# Patient Record
Sex: FEMALE | Race: WHITE | URBAN - NONMETROPOLITAN AREA
[De-identification: names, ages, dates, MRNs, and addresses within clinical notes are randomized per-mention and may not be internally consistent; named-entity substitution may affect disease eponyms.]

---

## 2020-10-28 ENCOUNTER — OPTICAL OFFICE (OUTPATIENT)
Dept: URBAN - NONMETROPOLITAN AREA CLINIC 5 | Facility: CLINIC | Age: 50
Setting detail: OPHTHALMOLOGY
End: 2020-10-28
Payer: COMMERCIAL

## 2020-10-28 ENCOUNTER — RX ONLY (RX ONLY)
Age: 50
End: 2020-10-28

## 2020-10-28 ENCOUNTER — DOCTOR'S OFFICE (OUTPATIENT)
Dept: URBAN - NONMETROPOLITAN AREA CLINIC 2 | Facility: CLINIC | Age: 50
Setting detail: OPHTHALMOLOGY
End: 2020-10-28
Payer: COMMERCIAL

## 2020-10-28 DIAGNOSIS — H52.4: ICD-10-CM

## 2020-10-28 DIAGNOSIS — H53.022: ICD-10-CM

## 2020-10-28 PROCEDURE — V2020 VISION SVCS FRAMES PURCHASES: HCPCS | Performed by: OPTOMETRIST

## 2020-10-28 PROCEDURE — 92004 COMPRE OPH EXAM NEW PT 1/>: CPT | Performed by: OPTOMETRIST

## 2020-10-28 PROCEDURE — V2200 LENS SPHER BIFOC PLANO 4.00D: HCPCS | Performed by: OPTOMETRIST

## 2020-10-28 PROCEDURE — V2744 TINT PHOTOCHROMATIC LENS/ES: HCPCS | Performed by: OPTOMETRIST

## 2020-10-28 PROCEDURE — 92015 DETERMINE REFRACTIVE STATE: CPT | Performed by: OPTOMETRIST

## 2020-10-28 PROCEDURE — V2784 LENS POLYCARB OR EQUAL: HCPCS | Performed by: OPTOMETRIST

## 2020-10-28 PROCEDURE — V2781 PROGRESSIVE LENS PER LENS: HCPCS | Performed by: OPTOMETRIST

## 2020-10-28 ASSESSMENT — REFRACTION_AUTOREFRACTION
OS_SPHERE: +3.25
OS_AXIS: 172
OS_CYLINDER: -4.00
OD_SPHERE: +0.25
OD_AXIS: 000
OD_CYLINDER: 0.00

## 2020-10-28 ASSESSMENT — REFRACTION_MANIFEST
OD_ADD: +1.75
OD_SPHERE: PL
OD_VA1: 20/20
OD_VA2: 20/20
OD_VA2: 20/20
OS_VA1: 20/200NI
OD_SPHERE: PL
OS_VA2: 20/200NI
OD_ADD: +1.75
OS_AXIS: 175
OS_ADD: +1.75
OS_CYLINDER: -4.00
OS_VA1: 20/200NI
OS_SPHERE: +3.00
OS_ADD: +1.75
OD_VA1: 20/20
OS_SPHERE: BALANCE
OS_VA2: 20/200NI

## 2020-10-28 ASSESSMENT — VISUAL ACUITY
OS_BCVA: 20/20
OD_BCVA: 20/400+1

## 2020-10-28 ASSESSMENT — AXIALLENGTH_DERIVED
OS_AL: 22.5137
OD_AL: 22.9175
OS_AL: 22.6028

## 2020-10-28 ASSESSMENT — SPHEQUIV_DERIVED
OS_SPHEQUIV: 1.25
OS_SPHEQUIV: 1
OD_SPHEQUIV: 0.25

## 2020-10-28 ASSESSMENT — KERATOMETRY
OD_K2POWER_DIOPTERS: 45.50
OD_AXISANGLE_DEGREES: 017
OS_K2POWER_DIOPTERS: 47.00
OD_K1POWER_DIOPTERS: 44.75
OS_K1POWER_DIOPTERS: 43.50
OS_AXISANGLE_DEGREES: 077

## 2020-10-28 ASSESSMENT — TONOMETRY
OD_IOP_MMHG: 20
OS_IOP_MMHG: 20

## 2020-10-28 ASSESSMENT — REFRACTION_CURRENTRX
OD_OVR_VA: 20/
OS_OVR_VA: 20/

## 2020-10-28 ASSESSMENT — CONFRONTATIONAL VISUAL FIELD TEST (CVF)
OS_FINDINGS: FULL
OD_FINDINGS: FULL

## 2022-06-08 ENCOUNTER — OFFICE VISIT (OUTPATIENT)
Dept: URGENT CARE | Facility: CLINIC | Age: 52
End: 2022-06-08
Payer: COMMERCIAL

## 2022-06-08 VITALS
WEIGHT: 129 LBS | OXYGEN SATURATION: 99 % | TEMPERATURE: 99.2 F | RESPIRATION RATE: 18 BRPM | HEART RATE: 63 BPM | BODY MASS INDEX: 21.49 KG/M2 | HEIGHT: 65 IN

## 2022-06-08 DIAGNOSIS — J02.9 SORE THROAT: Primary | ICD-10-CM

## 2022-06-08 DIAGNOSIS — R05.9 COUGH: ICD-10-CM

## 2022-06-08 LAB
S PYO AG THROAT QL: NEGATIVE
SARS-COV-2 AG UPPER RESP QL IA: NEGATIVE
VALID CONTROL: NORMAL

## 2022-06-08 PROCEDURE — 99203 OFFICE O/P NEW LOW 30 MIN: CPT

## 2022-06-08 PROCEDURE — 87880 STREP A ASSAY W/OPTIC: CPT

## 2022-06-08 PROCEDURE — 87811 SARS-COV-2 COVID19 W/OPTIC: CPT

## 2022-06-08 RX ORDER — SUMATRIPTAN 100 MG/1
100 TABLET, FILM COATED ORAL AS NEEDED
COMMUNITY
Start: 2022-01-26

## 2022-06-08 RX ORDER — SERTRALINE HYDROCHLORIDE 25 MG/1
25 TABLET, FILM COATED ORAL DAILY
COMMUNITY
Start: 2022-06-07

## 2022-06-08 RX ORDER — TOPIRAMATE 100 MG/1
1 TABLET, FILM COATED ORAL 2 TIMES DAILY
COMMUNITY

## 2022-06-08 NOTE — PATIENT INSTRUCTIONS
You had a negative Rapid Covid test and a negative rapid strep  Use a warm mist humidifier or vaporizer  Hot tea with honey  Warm saline gargle or throat lozenge may help with a sore throat  OTC saline nasal sprays   Drink plenty of fluids    Take OTC Mucinex DM

## 2022-06-08 NOTE — PROGRESS NOTES
[facility]        NAME: Nas Kellogg is a 54 y o  female  : 1970    MRN: [id number]  DATE: 2022  TIME: 6:08 PM    Assessment and Plan   Sore throat [J02 9]  1  Sore throat  POCT rapid strepA   2  Cough  Poct Covid 19 Rapid Antigen Test         Patient Instructions     You had a negative Rapid Covid test and a negative rapid strep  Use a warm mist humidifier or vaporizer  Hot tea with honey  Warm saline gargle or throat lozenge may help with a sore throat  OTC saline nasal sprays   Drink plenty of fluids  Take OTC Mucinex DM   Follow up with PCP in 3-5 days  Proceed to  ER if symptoms worsen  Chief Complaint     Chief Complaint   Patient presents with    Cold Like Symptoms     C/o nasal congestion, cough, headache, sore throat  Onset yesterday  Home Covid test negative  Requests repeat Covid test here  History of Present Illness       Sinusitis  This is a new problem  The current episode started yesterday  The problem is unchanged  There has been no fever  She is experiencing no pain  Associated symptoms include congestion, coughing, headaches and a sore throat  Pertinent negatives include no chills, ear pain, shortness of breath, sinus pressure, sneezing or swollen glands  Past treatments include oral decongestants  The treatment provided no relief  Review of Systems   Review of Systems   Constitutional: Negative for activity change, appetite change, chills, fatigue and fever  HENT: Positive for congestion, postnasal drip, rhinorrhea and sore throat  Negative for ear pain, sinus pressure, sinus pain and sneezing  Respiratory: Positive for cough  Negative for chest tightness, shortness of breath and wheezing  Cardiovascular: Negative for chest pain and palpitations  Gastrointestinal: Negative for abdominal pain, diarrhea, nausea and vomiting  Musculoskeletal: Negative for arthralgias  Neurological: Positive for headaches   Negative for dizziness, weakness and numbness  All other systems reviewed and are negative  Current Medications       Current Outpatient Medications:     sertraline (ZOLOFT) 25 mg tablet, Take 25 mg by mouth in the morning, Disp: , Rfl:     SUMAtriptan (IMITREX) 100 mg tablet, Take 100 mg by mouth as needed, Disp: , Rfl:     topiramate (TOPAMAX) 100 mg tablet, Take 1 tablet by mouth 2 (two) times a day, Disp: , Rfl:     Current Allergies     Allergies as of 06/08/2022    (No Known Allergies)            The following portions of the patient's history were reviewed and updated as appropriate: allergies, current medications, past family history, past medical history, past social history, past surgical history and problem list      Past Medical History:   Diagnosis Date    Migraines        Past Surgical History:   Procedure Laterality Date    HYSTERECTOMY         Family History   Problem Relation Age of Onset    Cancer Mother     Heart disease Father          Medications have been verified  Objective   Pulse 63   Temp 99 2 °F (37 3 °C)   Resp 18   Ht 5' 5" (1 651 m)   Wt 58 5 kg (129 lb)   SpO2 99%   BMI 21 47 kg/m²        Physical Exam     Physical Exam  Vitals and nursing note reviewed  Constitutional:       General: She is not in acute distress  Appearance: Normal appearance  She is normal weight  She is not ill-appearing or toxic-appearing  HENT:      Right Ear: Tympanic membrane normal       Left Ear: Tympanic membrane normal       Nose: Congestion and rhinorrhea present  Mouth/Throat:      Pharynx: Oropharynx is clear  No oropharyngeal exudate or posterior oropharyngeal erythema  Cardiovascular:      Rate and Rhythm: Normal rate and regular rhythm  Pulses: Normal pulses  Pulmonary:      Effort: Pulmonary effort is normal    Musculoskeletal:         General: Normal range of motion  Skin:     General: Skin is warm and dry        Capillary Refill: Capillary refill takes less than 2 seconds  Neurological:      General: No focal deficit present  Mental Status: She is alert and oriented to person, place, and time

## 2022-09-06 ENCOUNTER — EVALUATION (OUTPATIENT)
Dept: PHYSICAL THERAPY | Facility: CLINIC | Age: 52
End: 2022-09-06
Payer: COMMERCIAL

## 2022-09-06 DIAGNOSIS — M25.511 ACUTE PAIN OF RIGHT SHOULDER: Primary | ICD-10-CM

## 2022-09-06 DIAGNOSIS — M75.41 SHOULDER IMPINGEMENT SYNDROME, RIGHT: ICD-10-CM

## 2022-09-06 PROCEDURE — 97161 PT EVAL LOW COMPLEX 20 MIN: CPT | Performed by: PHYSICAL THERAPIST

## 2022-09-06 PROCEDURE — 97110 THERAPEUTIC EXERCISES: CPT | Performed by: PHYSICAL THERAPIST

## 2022-09-06 NOTE — PROGRESS NOTES
PT Evaluation     Today's date: 2022  Patient name: Vika Ulrich  : 1970  MRN: [id number]  Referring provider: Zurdo Armstrong DO  Dx:   Encounter Diagnosis     ICD-10-CM    1  Acute pain of right shoulder  M25 511    2  Shoulder impingement syndrome, right  M75 41        Start Time: 0800  Stop Time: 0900  Total time in clinic (min): 60 minutes    Assessment  Assessment details: Vika Ulrich presents to PT with 3 mth Hx of R shoulder pain of insidious onset  Significant findings from examination include: decreased ROM, painful/weak shoulder contractile testing, (+) impingement testing  These findings are consistent with R shoulder impingement syndrome, limiting their reach/carry tasks  Pt would benefit from course of PT to resolve the above mentioned impairments and facilitate return to PLOF       Impairments: abnormal muscle firing, abnormal or restricted ROM, activity intolerance, impaired physical strength, lacks appropriate home exercise program, pain with function and poor posture     Symptom irritability: highUnderstanding of Dx/Px/POC: good   Prognosis: good    Goals  Short Term Functional Goals:   In 4 weeks patient will:   Decrease R shoulder pain to 3 on a scale of 0-10 to allow dressing and grooming    Increase AROM ABD of R shoulder to 130* for performance of reach/carry tasks    Patient will demonstrate 50% independence and compliance with HEP to maximize improvements in functional mobility    Patient will demonstrate independence with proper posture, body mechanics, self pain management, and ADL modification    pt will increase FOTO score by 10pts to reflect a statistically significant improvement        Long Term Functional Goals (Goals for patient at discharge):    In 6 weeks patient will:   Decrease R shoulder pain to 1 on a scale of 0-10 to allow work and home tasks    Increase AROM ABD of R shoulder to 170* for performance of reach/carry tasks    pt will score at or above predicted discharge FOTO score of 67 to reflect improvement in subjective functional ability  Plan  Patient would benefit from: skilled physical therapy and PT eval  Planned modality interventions: unattended electrical stimulation and cryotherapy  Planned therapy interventions: manual therapy, neuromuscular re-education, patient education, therapeutic exercise, therapeutic activities and home exercise program  Frequency: 2x week  Duration in weeks: 6  Plan of Care beginning date: 2022  Plan of Care expiration date: 10/18/2022  Treatment plan discussed with: patient        Subjective Evaluation    History of Present Illness  Date of onset: 2022  Mechanism of injury: Reports onset of R shoulder pain since May s clear RAJAN  Agg c shoulder ABD and functional IR  Restricted c grooming and ADLs  Unable to sleep on affected side  Taking Advil for pain  Pain worst in morning  Not a recurrent problem   Quality of life: fair    Pain  Current pain ratin  At best pain ratin  At worst pain ratin  Quality: sharp and pulling  Relieving factors: medications  Aggravating factors: overhead activity  Progression: no change    Social Support    Employment status: working ([address] [place] [address] )  Hand dominance: right      Diagnostic Tests  No diagnostic tests performed  Treatments  Current treatment: medication  Patient Goals  Patient goals for therapy: decreased pain, increased motion, return to sport/leisure activities, independence with ADLs/IADLs and increased strength          Objective     Tenderness     Right Shoulder  Tenderness in the biceps tendon (proximal), infraspinatus tendon and supraspinatus tendon       Active Range of Motion   Left Shoulder   Flexion: 175 degrees   Abduction: 175 degrees   External rotation BTH: T6   Internal rotation BTB: T6     Right Shoulder   Flexion: 165 degrees   Abduction: 85 degrees   External rotation BTH: T1   Internal rotation BTB: L5 Passive Range of Motion   Left Shoulder   Normal passive range of motion    Right Shoulder   Flexion: 175 degrees   Abduction: 115 degrees   External rotation 0°: 45 degrees   External rotation 45°: 55 degrees   External rotation 90°: 70 degrees   Internal rotation 90°: 50 degrees     Strength/Myotome Testing     Left Shoulder   Normal muscle strength    Right Shoulder     Planes of Motion   Flexion: 4+   Abduction: 4   External rotation at 90°: 4-   Internal rotation at 0°: 3+     Tests     Right Shoulder   Positive crank, Hawkin's, horn blower, posterior load and shift and bicep load                 Precautions: none     Daily Treatment Diary:      Initial Evaluation Date: 09/06/22  Compliance 9/6                     Visit Number 1                    Re-Eval  IE                 [facility]   Foto Captured Y                           9/6                     Manual                      [address] post cuff                                                                  Ther-Ex                      Shoulder ROM 5                     Flex c cane  2'                     ER c cane 2'                     Horiz ADD stretch                      Sup full arc AROM x20                     Shoulder ISO 4 way 5x5" ea                                                                                       Edu 5                     Neuro Re-Ed                      (B) Banded ER                      Prone Scap Ret             Ball on wall                                       Ther-Act                                                               Modalities

## 2022-09-06 NOTE — LETTER
2022    Evelio Davila   [address] [address]    Patient: Nolene Ormond   YOB: 1970   Date of Visit: 2022     Encounter Diagnosis     ICD-10-CM    1  Acute pain of right shoulder  M25 511 CANCELED: PT plan of care cert/re-cert   2  Shoulder impingement syndrome, right  M75 41 CANCELED: PT plan of care cert/re-cert       Dear Dr John Mckeon:    Thank you for your recent referral of Nolene Ormond  Please review the attached evaluation summary from Neema's recent visit  Please verify that you agree with the plan of care by signing the attached order  If you have any questions or concerns, please do not hesitate to call  I sincerely appreciate the opportunity to share in the care of one of your patients and hope to have another opportunity to work with you in the near future  Sincerely,    Tony Spurling, PT      Referring Provider:      I certify that I have read the below Plan of Care and certify the need for these services furnished under this plan of treatment while under my care  Adelaidejavi DavilaDO  [address] [address]  Via Fax: [phone]          PT Evaluation     Today's date: 2022  Patient name: Nolene Ormond  : 1970  MRN: [id number]  Referring provider: Donovan Joy DO  Dx:   Encounter Diagnosis     ICD-10-CM    1  Acute pain of right shoulder  M25 511    2  Shoulder impingement syndrome, right  M75 41        Start Time: 0800  Stop Time: 0900  Total time in clinic (min): 60 minutes    Assessment  Assessment details: Nolene Ormond presents to PT with 3 mth Hx of R shoulder pain of insidious onset  Significant findings from examination include: decreased ROM, painful/weak shoulder contractile testing, (+) impingement testing  These findings are consistent with R shoulder impingement syndrome, limiting their reach/carry tasks   Pt would benefit from course of PT to resolve the above mentioned impairments and facilitate return to PLOF  Impairments: abnormal muscle firing, abnormal or restricted ROM, activity intolerance, impaired physical strength, lacks appropriate home exercise program, pain with function and poor posture     Symptom irritability: highUnderstanding of Dx/Px/POC: good   Prognosis: good    Goals  Short Term Functional Goals:   In 4 weeks patient will:   Decrease R shoulder pain to 3 on a scale of 0-10 to allow dressing and grooming    Increase AROM ABD of R shoulder to 130* for performance of reach/carry tasks    Patient will demonstrate 50% independence and compliance with HEP to maximize improvements in functional mobility    Patient will demonstrate independence with proper posture, body mechanics, self pain management, and ADL modification    pt will increase FOTO score by 10pts to reflect a statistically significant improvement        Long Term Functional Goals (Goals for patient at discharge):    In 6 weeks patient will:   Decrease R shoulder pain to 1 on a scale of 0-10 to allow work and home tasks    Increase AROM ABD of R shoulder to 170* for performance of reach/carry tasks    pt will score at or above predicted discharge FOTO score of 67 to reflect improvement in subjective functional ability           Plan  Patient would benefit from: skilled physical therapy and PT eval  Planned modality interventions: unattended electrical stimulation and cryotherapy  Planned therapy interventions: manual therapy, neuromuscular re-education, patient education, therapeutic exercise, therapeutic activities and home exercise program  Frequency: 2x week  Duration in weeks: 6  Plan of Care beginning date: 9/6/2022  Plan of Care expiration date: 10/18/2022  Treatment plan discussed with: patient        Subjective Evaluation    History of Present Illness  Date of onset: 5/1/2022  Mechanism of injury: Reports onset of R shoulder pain since May s clear RAJAN  Agg c shoulder ABD and functional IR  Restricted c grooming and ADLs  Unable to sleep on affected side  Taking Advil for pain  Pain worst in morning  Not a recurrent problem   Quality of life: fair    Pain  Current pain ratin  At best pain ratin  At worst pain ratin  Quality: sharp and pulling  Relieving factors: medications  Aggravating factors: overhead activity  Progression: no change    Social Support    Employment status: working ([address] [place] [address] )  Hand dominance: right      Diagnostic Tests  No diagnostic tests performed  Treatments  Current treatment: medication  Patient Goals  Patient goals for therapy: decreased pain, increased motion, return to sport/leisure activities, independence with ADLs/IADLs and increased strength          Objective     Tenderness     Right Shoulder  Tenderness in the biceps tendon (proximal), infraspinatus tendon and supraspinatus tendon  Active Range of Motion   Left Shoulder   Flexion: 175 degrees   Abduction: 175 degrees   External rotation BTH: T6   Internal rotation BTB: T6     Right Shoulder   Flexion: 165 degrees   Abduction: 85 degrees   External rotation BTH: T1   Internal rotation BTB: L5     Passive Range of Motion   Left Shoulder   Normal passive range of motion    Right Shoulder   Flexion: 175 degrees   Abduction: 115 degrees   External rotation 0°: 45 degrees   External rotation 45°: 55 degrees   External rotation 90°: 70 degrees   Internal rotation 90°: 50 degrees     Strength/Myotome Testing     Left Shoulder   Normal muscle strength    Right Shoulder     Planes of Motion   Flexion: 4+   Abduction: 4   External rotation at 90°: 4-   Internal rotation at 0°: 3+     Tests     Right Shoulder   Positive crank, Hawkin's, horn blower, posterior load and shift and bicep load                 Precautions: none     Daily Treatment Diary:      Initial Evaluation Date: 22  Compliance                      Visit Number 1 Re-Eval  IE                 [facility]   Foto Captured Y                           9/6                     Manual                      [facility] post cuff                                                                  Ther-Ex                      Shoulder ROM 5                     Flex c cane  2'                     ER c cane 2'                     Horiz ADD stretch                      Sup full arc AROM x20                     Shoulder ISO 4 way 5x5" ea                                                                                       Edu 5                     Neuro Re-Ed                      (B) Banded ER                      Prone Scap Ret             Ball on wall                                       Ther-Act                                                               Modalities

## 2022-09-08 ENCOUNTER — OFFICE VISIT (OUTPATIENT)
Dept: PHYSICAL THERAPY | Facility: CLINIC | Age: 52
End: 2022-09-08
Payer: COMMERCIAL

## 2022-09-08 DIAGNOSIS — M25.511 ACUTE PAIN OF RIGHT SHOULDER: ICD-10-CM

## 2022-09-08 DIAGNOSIS — M75.41 SHOULDER IMPINGEMENT SYNDROME, RIGHT: Primary | ICD-10-CM

## 2022-09-08 PROCEDURE — 97140 MANUAL THERAPY 1/> REGIONS: CPT | Performed by: PHYSICAL THERAPIST

## 2022-09-08 PROCEDURE — 97110 THERAPEUTIC EXERCISES: CPT | Performed by: PHYSICAL THERAPIST

## 2022-09-08 PROCEDURE — 97112 NEUROMUSCULAR REEDUCATION: CPT | Performed by: PHYSICAL THERAPIST

## 2022-09-08 NOTE — PROGRESS NOTES
Daily Note     Today's date: 2022  Patient name: Jcarlos Leo  : 1970  MRN: [id number]  Referring provider: Tracy Quinn DO  Dx:   Encounter Diagnosis     ICD-10-CM    1  Shoulder impingement syndrome, right  M75 41    2  Acute pain of right shoulder  M25 511        Start Time: 0700  Stop Time: 0755  Total time in clinic (min): 55 minutes    Subjective: pt states HEP going well since IE  Pain about the same  Can move shoulder more  Objective: See treatment diary below      Assessment: Jcarlos Leo was progressed with PT interventions, focusing on appropriate technique and intensity  Pt  Required Freq cuing from therapist to complete new exercises and to provide feedback on what she was feeling  Has empty endfeel during shoulder mobilizations  Pt  Would benefit from continued physical therapy to promote improved activity tolerance and function  Plan: Continue per plan of care  Progress treatment as tolerated         Precautions: none     Daily Treatment Diary:      Initial Evaluation Date: 22  Compliance                    Visit Number 1 2                   Re-Eval  IE                 [facility]   Foto Captured Y                                              Manual                      [facility] post cuff   8'                                                               Ther-Ex                      Shoulder ROM 5  5'                   Flex c cane  2'  3'                   ER c cane 2'  3'                   Horiz ADD stretch   10x10"                   Sup full arc AROM x20  x20                   Shoulder ISO 4 way 5x5" ea  8x5" ea                                                                                     Edu 5                     Neuro Re-Ed                      (B) Banded ER   2x10 5" Marvin                   Prone Scap Ret  10x10"           Ball on wall  2x20 ea           Horiz ABD  3x10 Purp                        Ther-Act Modalities

## 2022-09-12 ENCOUNTER — OFFICE VISIT (OUTPATIENT)
Dept: PHYSICAL THERAPY | Facility: CLINIC | Age: 52
End: 2022-09-12
Payer: COMMERCIAL

## 2022-09-12 DIAGNOSIS — M25.511 ACUTE PAIN OF RIGHT SHOULDER: ICD-10-CM

## 2022-09-12 DIAGNOSIS — M75.41 SHOULDER IMPINGEMENT SYNDROME, RIGHT: Primary | ICD-10-CM

## 2022-09-12 PROCEDURE — 97112 NEUROMUSCULAR REEDUCATION: CPT | Performed by: PHYSICAL THERAPIST

## 2022-09-12 PROCEDURE — 97110 THERAPEUTIC EXERCISES: CPT | Performed by: PHYSICAL THERAPIST

## 2022-09-12 PROCEDURE — 97140 MANUAL THERAPY 1/> REGIONS: CPT | Performed by: PHYSICAL THERAPIST

## 2022-09-12 NOTE — PROGRESS NOTES
Daily Note     Today's date: 2022  Patient name: Martir Kerr  : 1970  MRN: [id number]  Referring provider: Mike Chicas DO  Dx:   Encounter Diagnosis     ICD-10-CM    1  Shoulder impingement syndrome, right  M75 41    2  Acute pain of right shoulder  M25 511        Start Time: 0745  Stop Time: 0840  Total time in clinic (min): 55 minutes    Subjective: pt states shoulder sore down lateral arm  Woke her 2x last night  Objective: See treatment diary below      Assessment: Martir Kerr was progressed with PT interventions, focusing on appropriate technique and intensity  Mobilizations were better tolerated today compared to last week  Continues to have empty endfeel during shoulder mobilizations  Added reactive shoulder stabilization exercises for mm activation  Pt  Would benefit from continued physical therapy to promote improved activity tolerance and function  Plan: Continue per plan of care  Progress treatment as tolerated         Precautions: none     Daily Treatment Diary:      Initial Evaluation Date: 22  Compliance                  Visit Number 1 2  3                 Re-Eval  IE                  [employer] Captured Y                                            Manual                      [facility] post cuff   8'  8'                                                             Ther-Ex                      Shoulder ROM 5  5'  6'                 Flex c cane  2'  3'                   ER c cane 2'  3'  3'                 Horiz ADD stretch   10x10"  10x10"                 Sup full arc AROM x20  x20  2x20                 Shoulder ISO 4 way 5x5" ea  8x5" ea  8x5" ea                                                                                   Edu 5                     Neuro Re-Ed                      (B) Banded ER   2x10 5" Marvin  2x10 5" Marvin                 Prone Scap Ret  10x10" 10x10"          Ball on wall  2x20 ea 2x20 ea Horiz ABD  3x10 Purp D/c          Banded X pull-apart   Red 2x10 (B)          Shoulder 90* Osc   4x30"          Ther-Act                                                               Modalities

## 2022-09-14 ENCOUNTER — OFFICE VISIT (OUTPATIENT)
Dept: PHYSICAL THERAPY | Facility: CLINIC | Age: 52
End: 2022-09-14
Payer: COMMERCIAL

## 2022-09-14 DIAGNOSIS — M25.511 ACUTE PAIN OF RIGHT SHOULDER: ICD-10-CM

## 2022-09-14 DIAGNOSIS — M75.41 SHOULDER IMPINGEMENT SYNDROME, RIGHT: Primary | ICD-10-CM

## 2022-09-14 PROCEDURE — 97112 NEUROMUSCULAR REEDUCATION: CPT | Performed by: PHYSICAL THERAPIST

## 2022-09-14 PROCEDURE — 97140 MANUAL THERAPY 1/> REGIONS: CPT | Performed by: PHYSICAL THERAPIST

## 2022-09-14 PROCEDURE — 97110 THERAPEUTIC EXERCISES: CPT | Performed by: PHYSICAL THERAPIST

## 2022-09-14 NOTE — PROGRESS NOTES
Daily Note     Today's date: 2022  Patient name: Kim Swanson  : 1970  MRN: [id number]  Referring provider: Marc Darling DO  Dx:   Encounter Diagnosis     ICD-10-CM    1  Shoulder impingement syndrome, right  M75 41    2  Acute pain of right shoulder  M25 511        Start Time: 0745  Stop Time: 0840  Total time in clinic (min): 55 minutes    Subjective: pt states shoulder pain decreasing and HEP getting easier  Objective: See treatment diary below      Assessment: Kim Swanson was progressed with PT interventions, focusing on appropriate technique and intensity  Mobilizations were better tolerated today compared to last week  Continues to have empty endfeel during shoulder mobilizations  Added reactive shoulder stabilization exercises for mm activation  Pt  Would benefit from continued physical therapy to promote improved activity tolerance and function  Plan: Continue per plan of care  Progress treatment as tolerated         Precautions: none     Daily Treatment Diary:      Initial Evaluation Date: 22  Compliance                Visit Number 1 2  3  4               Re-Eval  IE                 [facility]   Foto Captured Y                                          Manual                      [facility] post cuff   8'  8'                                                             Ther-Ex                      UBE    3'/3'         Shoulder ROM 5  5'  6'  6'               Flex c cane  2'  3'                   ER c cane 2'  3'  3'  3'               Horiz ADD stretch   10x10"  10x10"  10x10"               Sup full arc AROM x20  x20  2x20   2x20               Shoulder ISO 4 way 5x5" ea  8x5" ea  8x5" ea                 Banded ER/IR       Marvin 2x10 ea               [employer]ion      NV                                     Edu 5                     Neuro Re-Ed                      (B) Banded ER   2x10 5" Marvin  2x10 5" Marvin  2x10 5" Marvin Prone Scap Ret  10x10" 10x10" 10x10"         Ball on wall  2x20 ea 2x20 ea 2x20 ea         Horiz ABD  3x10 Purp D/c          Banded X pull-apart   Red 2x10 (B) Red 2x10 (B)         Shoulder 90* Osc   4x30" 4x30"         Ther-Act                                                               Modalities

## 2022-09-15 ENCOUNTER — HOSPITAL ENCOUNTER (OUTPATIENT)
Dept: RADIOLOGY | Facility: HOSPITAL | Age: 52
End: 2022-09-15
Payer: COMMERCIAL

## 2022-09-15 DIAGNOSIS — M25.511 PAIN IN RIGHT SHOULDER: ICD-10-CM

## 2022-09-15 DIAGNOSIS — M25.511 PAIN IN JOINT OF RIGHT SHOULDER: ICD-10-CM

## 2022-09-15 PROCEDURE — 73030 X-RAY EXAM OF SHOULDER: CPT

## 2022-09-19 ENCOUNTER — APPOINTMENT (OUTPATIENT)
Dept: PHYSICAL THERAPY | Facility: CLINIC | Age: 52
End: 2022-09-19
Payer: COMMERCIAL

## 2022-09-21 ENCOUNTER — APPOINTMENT (OUTPATIENT)
Dept: PHYSICAL THERAPY | Facility: CLINIC | Age: 52
End: 2022-09-21
Payer: COMMERCIAL

## 2022-09-26 ENCOUNTER — APPOINTMENT (OUTPATIENT)
Dept: PHYSICAL THERAPY | Facility: CLINIC | Age: 52
End: 2022-09-26
Payer: COMMERCIAL

## 2022-09-28 ENCOUNTER — APPOINTMENT (OUTPATIENT)
Dept: PHYSICAL THERAPY | Facility: CLINIC | Age: 52
End: 2022-09-28
Payer: COMMERCIAL

## 2022-10-20 NOTE — PROGRESS NOTES
Non-Visit Discharge     Today's date:  10/20/2022    Patient name:  Corby Glasgow    : 1970    MRN: [id number]   Referring provider:  Roseline Rodriguez, DO       Encounter Diagnoses   Name Primary? • Shoulder impingement syndrome, right Yes   • Acute pain of right shoulder          Corby Glasgow has been seen for 4 visits of PT related to acute R shoulder pain  They have not returned following their last appointment and have not responded to rescheduling attempts  They are discharged from our care at this time

## 2023-05-26 ENCOUNTER — OFFICE VISIT (OUTPATIENT)
Dept: URGENT CARE | Facility: CLINIC | Age: 53
End: 2023-05-26

## 2023-05-26 VITALS
HEART RATE: 66 BPM | OXYGEN SATURATION: 100 % | HEIGHT: 65 IN | RESPIRATION RATE: 18 BRPM | SYSTOLIC BLOOD PRESSURE: 137 MMHG | WEIGHT: 122 LBS | BODY MASS INDEX: 20.33 KG/M2 | DIASTOLIC BLOOD PRESSURE: 86 MMHG | TEMPERATURE: 98.4 F

## 2023-05-26 DIAGNOSIS — M70.62 TROCHANTERIC BURSITIS OF LEFT HIP: Primary | ICD-10-CM

## 2023-05-26 RX ORDER — PREDNISONE 10 MG/1
10 TABLET ORAL DAILY
Qty: 21 TABLET | Refills: 0 | Status: SHIPPED | OUTPATIENT
Start: 2023-05-26

## 2023-05-26 NOTE — PROGRESS NOTES
[facility]        NAME: Anika Casillas is a 50 y o  female  : 1970    MRN: [id number]  DATE: May 26, 2023  TIME: 11:07 AM    Assessment and Plan   Trochanteric bursitis of left hip [M70 62]  1  Trochanteric bursitis of left hip  predniSONE 10 mg tablet    Ambulatory Referral to Physical Therapy    Ambulatory Referral to Orthopedic Surgery            Patient Instructions   Prednisone  Tylenol  Ice  Stretch  Range of motion  Physical therapy  Orthopedic surgery  Follow up with PCP in 3-5 days  Proceed to  ER if symptoms worsen  Chief Complaint     Chief Complaint   Patient presents with   • Leg Pain     Pt reports left hip/leg pain since last evening, more so with walking and bending over  Denies any trauma  Describes the pain as a burning sensation  History of Present Illness       Patient is a 53-year-old female with no significant past medical history presents the office planing of left hip pain since last night  Pain is located to the proximal lateral aspect  rated 3 out of 10 at rest and 8 out of 10 stabbing with ambulation and flexion  Denies any injury or trauma  Reports she just returned back from [facility] [place]  Review of Systems   Review of Systems   Musculoskeletal: Positive for arthralgias           Current Medications       Current Outpatient Medications:   •  predniSONE 10 mg tablet, Take 1 tablet (10 mg total) by mouth daily Take 6 on day 1, take 5 on day 2, take 4 on day 3, take 3 on day 4, take 2 on day 5, take 1 on day 6 , Disp: 21 tablet, Rfl: 0  •  sertraline (ZOLOFT) 25 mg tablet, Take 25 mg by mouth in the morning, Disp: , Rfl:   •  SUMAtriptan (IMITREX) 100 mg tablet, Take 100 mg by mouth as needed, Disp: , Rfl:   •  topiramate (TOPAMAX) 100 mg tablet, Take 1 tablet by mouth 2 (two) times a day, Disp: , Rfl:     Current Allergies     Allergies as of 2023   • (No Known Allergies)            The following portions of the patient's history "were reviewed and updated as appropriate: allergies, current medications, past family history, past medical history, past social history, past surgical history and problem list      Past Medical History:   Diagnosis Date   • Migraines        Past Surgical History:   Procedure Laterality Date   • HYSTERECTOMY         Family History   Problem Relation Age of Onset   • Cancer Mother    • Heart disease Father          Medications have been verified  Objective   /86   Pulse 66   Temp 98 4 °F (36 9 °C)   Resp 18   Ht 5' 5\" (1 651 m)   Wt 55 3 kg (122 lb)   SpO2 100%   BMI 20 30 kg/m²   No LMP recorded  Patient has had a hysterectomy  Physical Exam     Physical Exam  Vitals and nursing note reviewed  Constitutional:       Appearance: She is well-developed  HENT:      Head: Normocephalic and atraumatic  Right Ear: External ear normal       Left Ear: External ear normal       Nose: Nose normal    Eyes:      General: Lids are normal       Conjunctiva/sclera: Conjunctivae normal    Musculoskeletal:      Left hip: Bony tenderness (point tenderness over greater trochanter) present  Decreased range of motion (pain on end flexion)  Normal strength  Skin:     General: Skin is warm and dry  Capillary Refill: Capillary refill takes less than 2 seconds  Findings: No rash  Neurological:      Mental Status: She is alert                     "

## 2023-06-05 ENCOUNTER — OFFICE VISIT (OUTPATIENT)
Dept: OBGYN CLINIC | Facility: CLINIC | Age: 53
End: 2023-06-05
Payer: COMMERCIAL

## 2023-06-05 VITALS
HEART RATE: 75 BPM | HEIGHT: 65 IN | DIASTOLIC BLOOD PRESSURE: 78 MMHG | WEIGHT: 122.6 LBS | BODY MASS INDEX: 20.43 KG/M2 | TEMPERATURE: 97.6 F | SYSTOLIC BLOOD PRESSURE: 120 MMHG

## 2023-06-05 DIAGNOSIS — M25.552 PAIN OF LEFT HIP: Primary | ICD-10-CM

## 2023-06-05 DIAGNOSIS — M70.62 TROCHANTERIC BURSITIS OF LEFT HIP: ICD-10-CM

## 2023-06-05 PROCEDURE — 20610 DRAIN/INJ JOINT/BURSA W/O US: CPT | Performed by: PHYSICIAN ASSISTANT

## 2023-06-05 PROCEDURE — 99244 OFF/OP CNSLTJ NEW/EST MOD 40: CPT | Performed by: ORTHOPAEDIC SURGERY

## 2023-06-05 PROCEDURE — S0020 INJECTION, BUPIVICAINE HYDRO: HCPCS | Performed by: PHYSICIAN ASSISTANT

## 2023-06-05 RX ORDER — TRIAMCINOLONE ACETONIDE 40 MG/ML
80 INJECTION, SUSPENSION INTRA-ARTICULAR; INTRAMUSCULAR
Status: COMPLETED | OUTPATIENT
Start: 2023-06-05 | End: 2023-06-05

## 2023-06-05 RX ORDER — BUPIVACAINE HYDROCHLORIDE 2.5 MG/ML
4 INJECTION, SOLUTION INFILTRATION; PERINEURAL
Status: COMPLETED | OUTPATIENT
Start: 2023-06-05 | End: 2023-06-05

## 2023-06-05 RX ADMIN — TRIAMCINOLONE ACETONIDE 80 MG: 40 INJECTION, SUSPENSION INTRA-ARTICULAR; INTRAMUSCULAR at 08:30

## 2023-06-05 RX ADMIN — BUPIVACAINE HYDROCHLORIDE 4 ML: 2.5 INJECTION, SOLUTION INFILTRATION; PERINEURAL at 08:30

## 2023-06-05 NOTE — PROGRESS NOTES
"50 y o female presents for care follow-up of diagnosis of left trochanteric bursitis  Patient was put on prednisone by the urgent care along with Tylenol icing and stretching  He states the prednisone stopped last Thursday  On the prednisone this area got better but since being off the prednisone she is getting recurrent pain  She denies any history of trauma or fall  She relates the onset of symptoms related to a trip to [place] where she did a lot of walking  That cortisone is injections in her shoulder in the past without adverse effects  Is normally a left side sleeper and states she cannot sleep on the left side  Denies any specific back pain or numbness or tingling going down her left leg  Denies pain in the groin any bowel or bladder changes  Review of Systems  Review of systems negative unless otherwise specified in HPI    Past Medical History  Past Medical History:   Diagnosis Date   • Migraines      Past Surgical History  Past Surgical History:   Procedure Laterality Date   • HYSTERECTOMY       Current Medications  Current Outpatient Medications on File Prior to Visit   Medication Sig Dispense Refill   • sertraline (ZOLOFT) 25 mg tablet Take 25 mg by mouth in the morning     • SUMAtriptan (IMITREX) 100 mg tablet Take 100 mg by mouth as needed     • topiramate (TOPAMAX) 100 mg tablet Take 1 tablet by mouth 2 (two) times a day     • predniSONE 10 mg tablet Take 1 tablet (10 mg total) by mouth daily Take 6 on day 1, take 5 on day 2, take 4 on day 3, take 3 on day 4, take 2 on day 5, take 1 on day 6  (Patient not taking: Reported on 6/5/2023) 21 tablet 0     No current facility-administered medications on file prior to visit  Recent Labs (HCT,HGB,PT,INR,ESR,CRP,GLU,HgA1C)  No results found for: \"CRP\", \"ESR\", \"GLUCOSE\", \"HCT\", \"HGB\", \"HGBA1C\", \"INR\", \"PT\", \"WBC\"    Physical exam  Body mass index is 20 4 kg/m²     · General: Awake, Alert, Oriented  · Eyes: Pupils equal, round and reactive to " "light  · Heart: regular rate and rhythm  · Lungs: No audible wheezing  · Abdomen: soft  left Lower extremity  · She locates her area discomfort over the left greater trochanteric region  There is no groin pain  There is no ecchymosis  There is no gross deformity  Tenderness along the IT band or proximally  Is also tightness in this area  She has a negative Stinchfield's test   She has a negative straight leg raise test   Right side-lying she is unable to passively adduct the knee to the table due to discomfort  She notes more pain with active abduction and she does with external rotation of the hip  Sensation is intact distally distal pulses are intact  Durolane elicits no discomfort about the hip  Imaging  None today    1  Pain of left hip    2  Trochanteric bursitis of left hip      Assessment:  left hip troches bursitis without history of trauma  Large joint arthrocentesis: L greater trochanteric bursa  Universal Protocol:  Consent: Verbal consent obtained  Risks and benefits: risks, benefits and alternatives were discussed  Consent given by: patient  Time out: Immediately prior to procedure a \"time out\" was called to verify the correct patient, procedure, equipment, support staff and site/side marked as required    Timeout called at: 6/5/2023 9:02 AM   Patient understanding: patient states understanding of the procedure being performed  Site marked: the operative site was marked  Patient identity confirmed: verbally with patient    Supporting Documentation  Indications: pain   Procedure Details  Location: hip - L greater trochanteric bursa  Preparation: Patient was prepped and draped in the usual sterile fashion  Needle size: 22 G  Ultrasound guidance: no  Approach: lateral  Medications administered: 4 mL bupivacaine 0 25 %; 80 mg triamcinolone acetonide 40 mg/mL    Patient tolerance: patient tolerated the procedure well with no immediate complications  Dressing:  Sterile dressing " applied    Injection done by Dr Chanelle Bhakta: Reinforced to the patient and need for physical therapy for stretching program and a prescription was placed  Continue to use ice with stretching  She may follow-up in orthopedic office in 3 to 4 weeks  This note was created with voice recognition software

## 2023-06-05 NOTE — PATIENT INSTRUCTIONS
Reinforced to the patient and need for physical therapy for stretching program and a prescription was placed  Continue to use ice with stretching  She may follow-up in orthopedic office in 3 to 4 weeks